# Patient Record
Sex: MALE | Race: BLACK OR AFRICAN AMERICAN | Employment: OTHER | URBAN - METROPOLITAN AREA
[De-identification: names, ages, dates, MRNs, and addresses within clinical notes are randomized per-mention and may not be internally consistent; named-entity substitution may affect disease eponyms.]

---

## 2017-02-09 ENCOUNTER — TELEPHONE (OUTPATIENT)
Dept: CARDIOLOGY CLINIC | Age: 82
End: 2017-02-09

## 2017-02-09 NOTE — TELEPHONE ENCOUNTER
Wife called to advised pt was admitted to [facility] on 2-8. Pt couldn't move or function on yesterday, so the wife called 911 to take him to the hospital.  Pt was on the critical list but now doing better. Wife wanted Dr KOROMA to know.      Thanks

## 2017-02-10 ENCOUNTER — TELEPHONE (OUTPATIENT)
Dept: CARDIOLOGY CLINIC | Age: 82
End: 2017-02-10

## 2017-02-10 NOTE — TELEPHONE ENCOUNTER
[facility] for Tricia Riojas, daughter. Verified patient with two patient identifiers. Advised per Dr. Latasha Watt, he does not know neurologist who go to [facility], however she can call Dr. Tegan Goff office at [phone] and ask their opinion.

## 2017-02-10 NOTE — TELEPHONE ENCOUNTER
Pt's daughter called in, Marie Hines on [address] form, stating that her father is in [facility] and is not doing well. She wanted his opinion on a good neurologist for him to see. She seemed a little upset. I advised I would send to [facility] [place] and someone would call her later. She verbalized that she understood everything.

## 2021-08-03 PROBLEM — I25.10 CAD (CORONARY ARTERY DISEASE): Status: RESOLVED | Noted: 2021-08-03 | Resolved: 2021-08-03
